# Patient Record
Sex: FEMALE | Race: WHITE | NOT HISPANIC OR LATINO | URBAN - METROPOLITAN AREA
[De-identification: names, ages, dates, MRNs, and addresses within clinical notes are randomized per-mention and may not be internally consistent; named-entity substitution may affect disease eponyms.]

---

## 2022-03-19 ENCOUNTER — EMERGENCY (EMERGENCY)
Facility: HOSPITAL | Age: 32
LOS: 1 days | Discharge: HOME | End: 2022-03-19
Attending: EMERGENCY MEDICINE | Admitting: EMERGENCY MEDICINE
Payer: COMMERCIAL

## 2022-03-19 VITALS
OXYGEN SATURATION: 98 % | WEIGHT: 179.9 LBS | SYSTOLIC BLOOD PRESSURE: 133 MMHG | HEART RATE: 101 BPM | RESPIRATION RATE: 16 BRPM | HEIGHT: 62 IN | DIASTOLIC BLOOD PRESSURE: 85 MMHG | TEMPERATURE: 98 F

## 2022-03-19 VITALS — HEART RATE: 97 BPM

## 2022-03-19 PROCEDURE — 71046 X-RAY EXAM CHEST 2 VIEWS: CPT | Mod: 26

## 2022-03-19 PROCEDURE — 93010 ELECTROCARDIOGRAM REPORT: CPT | Mod: 59

## 2022-03-19 PROCEDURE — 99284 EMERGENCY DEPT VISIT MOD MDM: CPT | Mod: 25

## 2022-03-19 NOTE — ED PROVIDER NOTE - OBJECTIVE STATEMENT
32 yo woman with complaint of "heartburn" in mid central abd /chest - burning - made better with food - similar to previous symptoms she has had in past. symptoms x 4 days. yesterday completely asymptomatic. pt states she felt anxious today and it precipitated her symptoms which promoted her to come into ER today     yesterday pt was in [facility] walking "up hills and felt great"  in er after arriving - pt states she feels reassured that she is in er and feels better.     previously had covid x 2   she is vaccinated.

## 2022-03-19 NOTE — ED PROVIDER NOTE - NSFOLLOWUPINSTRUCTIONS_ED_ALL_ED_FT
follow up with primary care doctor    follow up with your therapist     stay well hydrated     continue to exercise    return to ER if symptoms worsen or for any other concern

## 2022-03-19 NOTE — ED ADULT TRIAGE NOTE - ACCOMPANIED BY
Self
Hx of >5 nose bleeds in the past, mostly 10-15 in the summer months which resolve in a few months without any intervention.

## 2022-03-19 NOTE — ED PROVIDER NOTE - PATIENT PORTAL LINK FT
You can access the FollowMyHealth Patient Portal offered by [facility] by registering at the following website: [email or web address]. By joining [employer]’s FollowMyHealth portal, you will also be able to view your health information using other applications (apps) compatible with our system.

## 2022-03-19 NOTE — ED ADULT NURSE NOTE - OBJECTIVE STATEMENT
pt presents to ED for chest pain while working out yesterday. Pt denies any shortness of breath or palpitations

## 2022-03-19 NOTE — ED ADULT NURSE NOTE - NSIMPLEMENTINTERV_GEN_ALL_ED
Implemented All Universal Safety Interventions:  [place] to call system. Call bell, personal items and telephone within reach. Instruct patient to call for assistance. Room bathroom lighting operational. Non-slip footwear when patient is off stretcher. Physically safe environment: no spills, clutter or unnecessary equipment. Stretcher in lowest position, wheels locked, appropriate side rails in place.

## 2022-03-23 DIAGNOSIS — R10.13 EPIGASTRIC PAIN: ICD-10-CM

## 2022-03-23 DIAGNOSIS — R07.2 PRECORDIAL PAIN: ICD-10-CM

## 2025-04-02 NOTE — ED PROVIDER NOTE - RESPIRATORY NEGATIVE STATEMENT, MLM
Behavioral Health Consultation   Haylie Cuellar, PhD  Clinical Psychologist  4/2/2025      Time spent with Patient: 47 minutes  9:28-10:15  This is patient's 1st  [facility] appointment.    Reason for Consult:   Chief Complaint   Patient presents with    Depression    Stress       Referring Provider: Diony Red MD    Pt provided informed consent for the behavioral health program. Discussed with patient model of service to include the limits of confidentiality (i.e. abuse reporting, suicide intervention, etc.) and short-term intervention focused approach. Pt indicated understanding.    Subjective  LIFE CONTEXT   1. Family  With whom do you live? Spouse Anshul   or partner? Yes Length of relationship?  36  Children?  Yes 1 together, 3 total, 7 grand and 2 greats - all local. Good with Darrion (48)  Type of relationships with the people you live with?  Could be better  Supportive relationships? Spouse and Self -     2. Social  Friends? Yes ANDRES in [place]  Quality? Good Frequency of contact? daily  Other connection with community? no    3. Work/School  Work/go to school? 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 12 How are you doing? Good  How do you support yourself financially? work    4. Recreation  For fun? Relaxation?  Go to the beach 4-5 times per year, time in the yard, goes to farm in [place], tv   How often? frequent    5. Self-Care  Exercise on a regular basis for health? No  What type of exercise and how often? walking  in warm weather - usually 3 times per week   Sleep ok? normal with meds for 20+ years Eat ok? Poor in the last year  Use tobacco (what and how often)?  denies  Use caffeine (what and how often)?   some tea  Use alcohol (what and how often)? none  Use drugs (what and how often)? Current drug usage.  Type of drug:  Marijuana.  Frequency of use: gummies  in summer for help with sleep.  Duration of drug use:  3 
no chest pain, no cough, and no shortness of breath.